# Patient Record
Sex: FEMALE | Race: WHITE | HISPANIC OR LATINO | URBAN - METROPOLITAN AREA
[De-identification: names, ages, dates, MRNs, and addresses within clinical notes are randomized per-mention and may not be internally consistent; named-entity substitution may affect disease eponyms.]

---

## 2022-08-01 ENCOUNTER — OFFICE VISIT (OUTPATIENT)
Dept: URGENT CARE | Facility: CLINIC | Age: 37
End: 2022-08-01

## 2022-08-01 VITALS
BODY MASS INDEX: 31.41 KG/M2 | RESPIRATION RATE: 16 BRPM | DIASTOLIC BLOOD PRESSURE: 73 MMHG | HEART RATE: 95 BPM | TEMPERATURE: 97 F | HEIGHT: 60 IN | OXYGEN SATURATION: 98 % | SYSTOLIC BLOOD PRESSURE: 103 MMHG | WEIGHT: 160 LBS

## 2022-08-01 DIAGNOSIS — J02.9 SORE THROAT: Primary | ICD-10-CM

## 2022-08-01 DIAGNOSIS — J06.9 VIRAL URI: ICD-10-CM

## 2022-08-01 LAB
CTP QC/QA: YES
MOLECULAR STREP A: NEGATIVE

## 2022-08-01 PROCEDURE — 99203 PR OFFICE/OUTPT VISIT, NEW, LEVL III, 30-44 MIN: ICD-10-PCS | Mod: TIER,S$GLB,,

## 2022-08-01 PROCEDURE — 87651 STREP A DNA AMP PROBE: CPT | Mod: QW,S$GLB,,

## 2022-08-01 PROCEDURE — 87651 POCT STREP A MOLECULAR: ICD-10-PCS | Mod: QW,S$GLB,,

## 2022-08-01 PROCEDURE — 99203 OFFICE O/P NEW LOW 30 MIN: CPT | Mod: TIER,S$GLB,,

## 2022-08-01 NOTE — PROGRESS NOTES
Subjective:       Patient ID: Katie Menchaca is a 36 y.o. female.    Vitals:  height is 5' (1.524 m) and weight is 72.6 kg (160 lb). Her tympanic temperature is 97.2 °F (36.2 °C). Her blood pressure is 103/73 and her pulse is 95. Her respiration is 16 and oxygen saturation is 98%.     Chief Complaint: Sore Throat    Patient presents with a sore throat that began on Saturday. Patient is vaccinated but not boosted.     Provider note starts below:  Patient presents with sore throat, chills, body aches, lymph node swelling in her neck since Saturday.  She denies any known exposure to sick contacts.  She states she feels fevers but has not had a fever.  She has been taking Advil with mild relief.  No difficulty breathing or swallowing.    Sore Throat   This is a new problem. The current episode started in the past 7 days. The problem has been gradually worsening. The pain is worse on the right side. There has been no fever. The pain is at a severity of 7/10. The pain is moderate. Associated symptoms include headaches, swollen glands and trouble swallowing. Pertinent negatives include no congestion, coughing, diarrhea, ear pain, shortness of breath or vomiting. She has had no exposure to strep or mono. Treatments tried: advil. The treatment provided mild relief.       Constitution: Positive for chills. Negative for fever.   HENT: Positive for sore throat and trouble swallowing. Negative for ear pain, congestion, postnasal drip, sinus pain and sinus pressure.    Respiratory: Negative for cough, sputum production, shortness of breath and wheezing.    Gastrointestinal: Negative for nausea, vomiting and diarrhea.   Musculoskeletal: Positive for muscle ache.   Neurological: Positive for headaches.       Objective:      Physical Exam   Constitutional: She is oriented to person, place, and time. She appears well-developed. She is cooperative.  Non-toxic appearance. She does not appear ill. No distress.   HENT:   Head:  Normocephalic and atraumatic.   Ears:   Right Ear: Hearing, tympanic membrane, external ear and ear canal normal.   Left Ear: Hearing, tympanic membrane, external ear and ear canal normal.   Nose: Nose normal. No mucosal edema, rhinorrhea or nasal deformity. No epistaxis. Right sinus exhibits no maxillary sinus tenderness and no frontal sinus tenderness. Left sinus exhibits no maxillary sinus tenderness and no frontal sinus tenderness.   Mouth/Throat: Uvula is midline and mucous membranes are normal. No trismus in the jaw. Normal dentition. No uvula swelling. Posterior oropharyngeal erythema present. No oropharyngeal exudate or posterior oropharyngeal edema. Tonsils are 2+ on the right. Tonsils are 2+ on the left. No tonsillar exudate.   Eyes: Conjunctivae and lids are normal. No scleral icterus.   Neck: Trachea normal and phonation normal. Neck supple. No edema present. No erythema present. No neck rigidity present.   Cardiovascular: Normal rate, regular rhythm, normal heart sounds and normal pulses.   Pulmonary/Chest: Effort normal and breath sounds normal. No respiratory distress. She has no decreased breath sounds. She has no rhonchi.   Abdominal: Normal appearance.   Musculoskeletal: Normal range of motion.         General: No deformity. Normal range of motion.   Neurological: She is alert and oriented to person, place, and time. She exhibits normal muscle tone. Coordination normal.   Skin: Skin is warm, dry, intact, not diaphoretic and not pale.   Psychiatric: Her speech is normal and behavior is normal. Judgment and thought content normal.   Nursing note and vitals reviewed.        Results for orders placed or performed in visit on 08/01/22   POCT Strep A, Molecular   Result Value Ref Range    Molecular Strep A, POC Negative Negative     Acceptable Yes        Assessment:       1. Sore throat    2. Viral URI          Plan:     labs reviewed.     Sore throat  -     POCT Strep A, Molecular  -      (Magic mouthwash) 1:1:1 diphenhydramine(Benadryl) 12.5mg/5ml liq, aluminum & magnesium hydroxide-simethicone (Maalox), LIDOcaine viscous 2%; Swish and spit 5 mLs every 4 (four) hours as needed (gargle and spit for sore throat). for mouth sores  Dispense: 360 mL; Refill: 0    Viral URI      Patient Instructions   PLEASE READ ALL DISCHARGE INSTRUCTIONS    Continue to wear mask until you can test yourself for COVID at home    - Rest.    - Drink plenty of fluids.  - Viral upper respiratory infections typically run their course in 10-14 days.   - use mouthwash for sore throat     - You can take over-the-counter claritin, zyrtec, allegra, or xyzal as directed. These are antihistamines that can help with runny nose, nasal congestion, sneezing, and helps to dry up post-nasal drip, which usually causes sore throat and cough.               - If you do NOT have high blood pressure, you may use a decongestant form (D)  of this medication (ie. Claritin- D, zyrtec-D, allegra-D) or if you do not take the D form, you can take sudafed (pseudoephedrine) over the counter, which is a decongestant. Do NOT take two decongestant (D) medications at the same time (such as mucinex-D and claritin-D or plain sudafed and claritin D). Dextromethorphan (DM) is a cough suppressant over the counter (ie. mucinex DM, robitussin, delsym; dayquil/nyquil has DM as well.)    -If you DO have high blood pressure, you may take Coricidin HBP for sinus congestion and Delsym for cough.      - You can use Flonase (fluticasone) nasal spray as directed for sinus congestion and postnasal drip. This is a steroid nasal spray that works locally over time to decrease the inflammation in your nose/sinuses and help with allergic symptoms. This is not an quick- relief spray like afrin, but it works well if used daily.  Discontinue if you develop nose bleed  - Use nasal saline prior to Flonase.  - Use Ocean Spray Nasal Saline 1-3 puffs each nostril every 2-3 hours then  blow out onto tissue. This is to irrigate the nasal passage way to clear the sinus openings. Use until sinus problem resolved.     - You can take plain Mucinex (guaifenesin) 1200 mg twice a day to help loosen mucous.      - Warm salt water gargles can help with sore throat     - Warm tea with honey can help with cough. Honey is a natural cough suppressant.    - Acetaminophen (tylenol) or Ibuprofen (advil,motrin) as directed as needed for fever/pain. Avoid tylenol if you have a history of liver disease. Do not take ibuprofen if you have a history of GI bleeding, kidney disease, or if you take blood thinners.     - You must understand that you have received an Urgent Care treatment only and that you may be released before all of your medical problems are known or treated.   - You, the patient, will arrange for follow up care as instructed.   - If your condition worsens or fails to improve we recommend that you receive another evaluation at the ER immediately or contact your PCP to discuss your concerns or return here.   - Follow up with your PCP or specialty clinic as directed in the next 1-2 weeks if not improved or as needed.  You can call [phone] to schedule an appointment with the appropriate provider.

## 2022-08-01 NOTE — PATIENT INSTRUCTIONS
PLEASE READ ALL DISCHARGE INSTRUCTIONS    Continue to wear mask until you can test yourself for COVID at home    - Rest.    - Drink plenty of fluids.  - Viral upper respiratory infections typically run their course in 10-14 days.   - use mouthwash for sore throat     - You can take over-the-counter claritin, zyrtec, allegra, or xyzal as directed. These are antihistamines that can help with runny nose, nasal congestion, sneezing, and helps to dry up post-nasal drip, which usually causes sore throat and cough.               - If you do NOT have high blood pressure, you may use a decongestant form (D)  of this medication (ie. Claritin- D, zyrtec-D, allegra-D) or if you do not take the D form, you can take sudafed (pseudoephedrine) over the counter, which is a decongestant. Do NOT take two decongestant (D) medications at the same time (such as mucinex-D and claritin-D or plain sudafed and claritin D). Dextromethorphan (DM) is a cough suppressant over the counter (ie. mucinex DM, robitussin, delsym; dayquil/nyquil has DM as well.)    -If you DO have high blood pressure, you may take Coricidin HBP for sinus congestion and Delsym for cough.      - You can use Flonase (fluticasone) nasal spray as directed for sinus congestion and postnasal drip. This is a steroid nasal spray that works locally over time to decrease the inflammation in your nose/sinuses and help with allergic symptoms. This is not an quick- relief spray like afrin, but it works well if used daily.  Discontinue if you develop nose bleed  - Use nasal saline prior to Flonase.  - Use Ocean Spray Nasal Saline 1-3 puffs each nostril every 2-3 hours then blow out onto tissue. This is to irrigate the nasal passage way to clear the sinus openings. Use until sinus problem resolved.     - You can take plain Mucinex (guaifenesin) 1200 mg twice a day to help loosen mucous.      - Warm salt water gargles can help with sore throat     - Warm tea with honey can help with  cough. Honey is a natural cough suppressant.    - Acetaminophen (tylenol) or Ibuprofen (advil,motrin) as directed as needed for fever/pain. Avoid tylenol if you have a history of liver disease. Do not take ibuprofen if you have a history of GI bleeding, kidney disease, or if you take blood thinners.     - You must understand that you have received an Urgent Care treatment only and that you may be released before all of your medical problems are known or treated.   - You, the patient, will arrange for follow up care as instructed.   - If your condition worsens or fails to improve we recommend that you receive another evaluation at the ER immediately or contact your PCP to discuss your concerns or return here.   - Follow up with your PCP or specialty clinic as directed in the next 1-2 weeks if not improved or as needed.  You can call [phone] to schedule an appointment with the appropriate provider.